# Patient Record
Sex: MALE | Race: BLACK OR AFRICAN AMERICAN | NOT HISPANIC OR LATINO | URBAN - METROPOLITAN AREA
[De-identification: names, ages, dates, MRNs, and addresses within clinical notes are randomized per-mention and may not be internally consistent; named-entity substitution may affect disease eponyms.]

---

## 2023-06-01 ENCOUNTER — TELEPHONE (OUTPATIENT)
Dept: CARDIOLOGY | Facility: MEDICAL CENTER | Age: 38
End: 2023-06-01

## 2023-06-01 NOTE — TELEPHONE ENCOUNTER
Spoke to patient in regards to records for NP appointment with Maria Luisa.     Patient has seen a cardiologist before?  No   If yes, where?:     Any recent cardiac testing?  Yes   What testing: EKG   Where was it completed?: [facility]    Requested any records?  No   Fax:   Facility:

## 2023-06-09 ENCOUNTER — OFFICE VISIT (OUTPATIENT)
Dept: CARDIOLOGY | Facility: MEDICAL CENTER | Age: 38
End: 2023-06-09
Attending: INTERNAL MEDICINE
Payer: OTHER GOVERNMENT

## 2023-06-09 VITALS
DIASTOLIC BLOOD PRESSURE: 78 MMHG | SYSTOLIC BLOOD PRESSURE: 146 MMHG | BODY MASS INDEX: 25.45 KG/M2 | RESPIRATION RATE: 14 BRPM | HEIGHT: 73 IN | WEIGHT: 192 LBS | HEART RATE: 84 BPM | OXYGEN SATURATION: 97 %

## 2023-06-09 DIAGNOSIS — I10 PRIMARY HYPERTENSION: ICD-10-CM

## 2023-06-09 DIAGNOSIS — Z76.89 ENCOUNTER TO ESTABLISH CARE: ICD-10-CM

## 2023-06-09 LAB — EKG IMPRESSION: NORMAL

## 2023-06-09 PROCEDURE — 93010 ELECTROCARDIOGRAM REPORT: CPT | Performed by: INTERNAL MEDICINE

## 2023-06-09 PROCEDURE — 99203 OFFICE O/P NEW LOW 30 MIN: CPT | Performed by: INTERNAL MEDICINE

## 2023-06-09 PROCEDURE — 3077F SYST BP >= 140 MM HG: CPT | Performed by: INTERNAL MEDICINE

## 2023-06-09 PROCEDURE — 99211 OFF/OP EST MAY X REQ PHY/QHP: CPT | Performed by: INTERNAL MEDICINE

## 2023-06-09 PROCEDURE — 3078F DIAST BP <80 MM HG: CPT | Performed by: INTERNAL MEDICINE

## 2023-06-09 PROCEDURE — 93005 ELECTROCARDIOGRAM TRACING: CPT | Performed by: INTERNAL MEDICINE

## 2023-06-09 RX ORDER — CHLORTHALIDONE 25 MG/1
25 TABLET ORAL DAILY
Qty: 90 TABLET | Refills: 3 | Status: SHIPPED | OUTPATIENT
Start: 2023-06-09 | End: 2023-06-09

## 2023-06-09 RX ORDER — CHLORTHALIDONE 25 MG/1
25 TABLET ORAL DAILY
Qty: 90 TABLET | Refills: 3 | Status: SHIPPED | OUTPATIENT
Start: 2023-06-09 | End: 2023-09-13

## 2023-06-09 RX ORDER — SPIRONOLACTONE 25 MG/1
TABLET ORAL
COMMUNITY
Start: 2023-05-22 | End: 2023-06-09

## 2023-06-09 NOTE — PROGRESS NOTES
[facility]    PatientName:Yobani RodriguezDate: 2023  :1985    37 y.o.PCP:No primary care provider on file.  MRN:[id number]          Problems and Plans    Primary hypertension  Clinically, he is doing well and blood pressure is relatively well controlled in the outpatient setting however elevated in the clinic today.  I have recommended that he transition from spironolactone to chlorthalidone therapy given his black heritage.  He will be more responsive to diuretic therapy as well as calcium channel blocker therapy.  We will transition to chlorthalidone 25 mg daily and he will monitor his blood pressure 1 to 2 hours after taking his medications and maintain a blood pressure log to be reviewed at his next office visit.    Return in about 3 months (around 2023).      Encounter    Reason for Visit / Chief Complaint: Essential hypertension    HPI    37-year-old male with known history of essential hypertension presents in consultation for evaluation of hypertension    Currently, he notes he has been feeling well not having any active significant cardiovascular complaints.  He is able to do his desired activities without any restrictions.  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  He notes that he has had the diagnosis of hypertension since 2018 and notes that his blood pressure can be labile.  He does monitor his blood pressure at home and notes that his readings are largely 120-128 millimeters of mercury systolic/90 mmHg diastolic.  He notes being on multiple medications prior consisting of hydrochlorothiazide, telmisartan and spironolactone.  He notes only taking spironolactone at this time.    Denies any recent cardiovascular work-up.  Past Medical History  History reviewed. No pertinent past medical history.  Past Surgical History  History reviewed. No pertinent surgical history.  Social  "History  Social History     Socioeconomic History    Marital status:      Spouse name: Not on file    Number of children: Not on file    Years of education: Not on file    Highest education level: Not on file   Occupational History    Not on file   Tobacco Use    Smoking status: Some Days     Types: Cigarettes    Smokeless tobacco: Never    Tobacco comments:     3-4 cigarettes a day   Substance and Sexual Activity    Alcohol use: Yes     Comment: occ    Drug use: Never    Sexual activity: Not on file   Other Topics Concern    Not on file   Social History Narrative    Not on file     Social Determinants of Health     Financial Resource Strain: Not on file   Food Insecurity: Not on file   Transportation Needs: Not on file   Physical Activity: Not on file   Stress: Not on file   Social Connections: Not on file   Intimate Partner Violence: Not on file   Housing Stability: Not on file     Past Family History  History reviewed. No pertinent family history.  Medication(s)  [unfilled]  Allergies  Patient has no known allergies.    Review of Systems    A comprehensive 10 system review was conducted and is negative except as noted above in the HPI or here.      Vital Signs  BP (!) 146/78 (BP Location: Left arm, Patient Position: Sitting, BP Cuff Size: Adult)   Pulse 84   Resp 14   Ht 1.854 m (6' 1\")   Wt 87.1 kg (192 lb)   SpO2 97%   BMI 25.33 kg/m²     Physical Exam  Constitutional:       Appearance: Normal appearance. He is obese.   HENT:      Head: Normocephalic and atraumatic.      Mouth/Throat:      Mouth: Mucous membranes are moist.      Pharynx: Oropharynx is clear.   Eyes:      Extraocular Movements: Extraocular movements intact.      Conjunctiva/sclera: Conjunctivae normal.   Cardiovascular:      Rate and Rhythm: Normal rate and regular rhythm.      Pulses: Normal pulses.   Pulmonary:      Effort: Pulmonary effort is normal.      Breath sounds: Normal breath sounds.   Abdominal:      General: " Bowel sounds are normal.      Palpations: Abdomen is soft.   Musculoskeletal:         General: Normal range of motion.      Cervical back: Normal range of motion and neck supple.   Skin:     General: Skin is warm and dry.   Neurological:      General: No focal deficit present.      Mental Status: He is alert and oriented to person, place, and time. Mental status is at baseline.   Psychiatric:         Mood and Affect: Mood normal.         Behavior: Behavior normal.         Thought Content: Thought content normal.         Judgment: Judgment normal.         No results found for: TSHULTRASEN   No results found for: FREET4   No results found for: HBA1C  No results found for: CHOLSTRLTOT, LDL, HDL, TRIGLYCERIDE    No results found for: SODIUM, POTASSIUM, CHLORIDE, CO2, GLUCOSE, BUN, CREATININE, BUNCREATRAT, GLOMRATE  No results found for: ALKPHOSPHAT, ASTSGOT, ALTSGPT, TBILIRUBIN      Imaging  EKG demonstrates sinus rhythm with borderline left ventricular hypertrophy.  I reviewed interpreted EKG.  Findings were discussed with the patient    Echocardiogram  No results found for this or any previous visit.    None      Total patient time was estimated to be 30 minutes consisting of chart review, direct patient interaction, medication renewal, plan development and overall communication with the cardiovascular team.        Electronically signed by:   Yoel Glass DO, MPH  [facility]    Portions of this note were completed using voice recognition software (Dragon Naturally speaking software) . Occasional transcription errors may have escaped proof reading. I have made every reasonable attempt to correct obvious errors, but I expect that there are errors of grammar and possibly content that I did not discover before finalizing the note.

## 2023-06-09 NOTE — ASSESSMENT & PLAN NOTE
Clinically, he is doing well and blood pressure is relatively well controlled in the outpatient setting however elevated in the clinic today.  I have recommended that he transition from spironolactone to chlorthalidone therapy given his black heritage.  He will be more responsive to diuretic therapy as well as calcium channel blocker therapy.  We will transition to chlorthalidone 25 mg daily and he will monitor his blood pressure 1 to 2 hours after taking his medications and maintain a blood pressure log to be reviewed at his next office visit.

## 2023-06-09 NOTE — PATIENT INSTRUCTIONS
Follow up in 3 months  Stop spironolactone  Start chlorthalidone 25mg daily  Continue current medications  Call with questions

## 2023-09-13 ENCOUNTER — OFFICE VISIT (OUTPATIENT)
Dept: CARDIOLOGY | Facility: MEDICAL CENTER | Age: 38
End: 2023-09-13
Attending: INTERNAL MEDICINE
Payer: OTHER GOVERNMENT

## 2023-09-13 VITALS
SYSTOLIC BLOOD PRESSURE: 126 MMHG | HEART RATE: 81 BPM | WEIGHT: 188.8 LBS | BODY MASS INDEX: 25.02 KG/M2 | HEIGHT: 73 IN | DIASTOLIC BLOOD PRESSURE: 92 MMHG | OXYGEN SATURATION: 98 %

## 2023-09-13 DIAGNOSIS — I10 PRIMARY HYPERTENSION: ICD-10-CM

## 2023-09-13 PROCEDURE — 99211 OFF/OP EST MAY X REQ PHY/QHP: CPT | Performed by: INTERNAL MEDICINE

## 2023-09-13 PROCEDURE — 3080F DIAST BP >= 90 MM HG: CPT | Performed by: INTERNAL MEDICINE

## 2023-09-13 PROCEDURE — 99213 OFFICE O/P EST LOW 20 MIN: CPT | Performed by: INTERNAL MEDICINE

## 2023-09-13 PROCEDURE — 3074F SYST BP LT 130 MM HG: CPT | Performed by: INTERNAL MEDICINE

## 2023-09-13 RX ORDER — CHLORTHALIDONE 50 MG/1
50 TABLET ORAL DAILY
Qty: 90 TABLET | Refills: 3 | Status: SHIPPED | OUTPATIENT
Start: 2023-09-13

## 2023-09-13 NOTE — ASSESSMENT & PLAN NOTE
Clinically, he is doing excellent and largely his blood pressure significantly improved however he still is having some diastolic hypertension.  At this time we will increase his chlorthalidone 50 mg daily.  I anticipate that he will do very well with the increase in dosage and will have his blood pressure is well controlled.  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

## 2023-09-13 NOTE — PATIENT INSTRUCTIONS
Follow up as needed  Increase Chlorthalidone 50mg daily  Continue current medications  Call with questions.

## 2023-09-13 NOTE — PROGRESS NOTES
[facility]    PatientName:Yobani RodriguezDate: 2023  :1985    37 y.o.PCP:Abhijeet Stover M.D.  MRN:[id number]          Problems and Plans    Primary hypertension  Clinically, he is doing excellent and largely his blood pressure significantly improved however he still is having some diastolic hypertension.  At this time we will increase his chlorthalidone 50 mg daily.  I anticipate that he will do very well with the increase in dosage and will have his blood pressure is well controlled.  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      Encounter    Reason for Visit / Chief Complaint: Essential hypertension    HPI    37-year-old male with known history of essential hypertension presents in clinic for ongoing management of his primary hypertension.    Currently, he notes he is feeling well and not having any significant cardiovascular complaints.  He is still actively engaged in his [employer] career and will ultimately be retiring with 20 years of service in the next 3 to 4 years.  He does note that he will be transferring to [employer] in [facility] in the next 1 to 2 months.      Past Medical History  No past medical history on file.  Past Surgical History  No past surgical history on file.  Social History  Social History     Socioeconomic History    Marital status:      Spouse name: Not on file    Number of children: Not on file    Years of education: Not on file    Highest education level: Not on file   Occupational History    Not on file   Tobacco Use    Smoking status: Some Days     Types: Cigarettes    Smokeless tobacco: Never    Tobacco comments:     3-4 cigarettes a day   Substance and Sexual Activity    Alcohol use: Yes     Comment: occ    Drug use: Never    Sexual activity: Not on file   Other Topics Concern    Not on file   Social History Narrative    Not on file     Social Determinants of  "Health     Financial Resource Strain: Not on file   Food Insecurity: Not on file   Transportation Needs: Not on file   Physical Activity: Not on file   Stress: Not on file   Social Connections: Not on file   Intimate Partner Violence: Not on file   Housing Stability: Not on file     Past Family History  No family history on file.  Medication(s)  [unfilled]  Allergies  Patient has no known allergies.    Review of Systems    A comprehensive 10 system review was conducted and is negative except as noted above in the HPI or here.      Vital Signs  BP (!) 126/92 (BP Location: Right arm, Patient Position: Sitting, BP Cuff Size: Adult)   Pulse 81   Ht 1.854 m (6' 1\")   Wt 85.6 kg (188 lb 12.8 oz)   SpO2 98%   BMI 24.91 kg/m²     Physical Exam  Constitutional:       Appearance: Normal appearance. He is obese.   HENT:      Head: Normocephalic and atraumatic.      Mouth/Throat:      Mouth: Mucous membranes are moist.      Pharynx: Oropharynx is clear.   Eyes:      Extraocular Movements: Extraocular movements intact.      Conjunctiva/sclera: Conjunctivae normal.   Cardiovascular:      Rate and Rhythm: Normal rate and regular rhythm.      Pulses: Normal pulses.      Heart sounds: Normal heart sounds. No murmur heard.     No friction rub. No gallop.   Pulmonary:      Effort: Pulmonary effort is normal.      Breath sounds: Normal breath sounds.   Abdominal:      General: Bowel sounds are normal.      Palpations: Abdomen is soft.   Musculoskeletal:         General: Normal range of motion.      Cervical back: Normal range of motion and neck supple.   Skin:     General: Skin is warm and dry.   Neurological:      General: No focal deficit present.      Mental Status: He is alert and oriented to person, place, and time. Mental status is at baseline.   Psychiatric:         Mood and Affect: Mood normal.         Behavior: Behavior normal.         Thought Content: Thought content normal.         Judgment: Judgment normal. " "        No results found for: \"TSHULTRASEN\"   No results found for: \"FREET4\"   No results found for: \"HBA1C\"  No results found for: \"CHOLSTRLTOT\", \"LDL\", \"HDL\", \"TRIGLYCERIDE\"    No results found for: \"SODIUM\", \"POTASSIUM\", \"CHLORIDE\", \"CO2\", \"GLUCOSE\", \"BUN\", \"CREATININE\", \"BUNCREATRAT\", \"GLOMRATE\"  No results found for: \"ALKPHOSPHAT\", \"ASTSGOT\", \"ALTSGPT\", \"TBILIRUBIN\"        Total patient time was estimated to be 15 minutes consisting of chart review, direct patient interaction, medication renewal, plan development and overall communication with the cardiovascular team.        Electronically signed by:   Yoel Glass DO, MPH  [facility]    Portions of this note were completed using voice recognition software (Dragon Naturally speaking software) . Occasional transcription errors may have escaped proof reading. I have made every reasonable attempt to correct obvious errors, but I expect that there are errors of grammar and possibly content that I did not discover before finalizing the note.      "